# Patient Record
Sex: MALE | Race: WHITE | NOT HISPANIC OR LATINO | Employment: FULL TIME | URBAN - METROPOLITAN AREA
[De-identification: names, ages, dates, MRNs, and addresses within clinical notes are randomized per-mention and may not be internally consistent; named-entity substitution may affect disease eponyms.]

---

## 2017-02-22 PROBLEM — K76.0 FATTY LIVER: Status: ACTIVE | Noted: 2017-02-22

## 2017-02-22 PROBLEM — Z12.11 SCREENING FOR COLON CANCER: Status: ACTIVE | Noted: 2017-02-22

## 2017-07-25 PROBLEM — N17.9 AKI (ACUTE KIDNEY INJURY): Status: ACTIVE | Noted: 2017-07-25

## 2017-07-25 PROBLEM — E87.5 HYPERKALEMIA: Status: ACTIVE | Noted: 2017-07-25

## 2017-07-25 PROBLEM — R07.89 ATYPICAL CHEST PAIN: Status: ACTIVE | Noted: 2017-07-25

## 2017-07-25 PROBLEM — R07.9 CHEST PAIN IN ADULT: Status: ACTIVE | Noted: 2017-07-25

## 2017-11-08 ENCOUNTER — TELEPHONE (OUTPATIENT)
Dept: ADMINISTRATIVE | Facility: HOSPITAL | Age: 57
End: 2017-11-08

## 2021-05-06 ENCOUNTER — PATIENT MESSAGE (OUTPATIENT)
Dept: RESEARCH | Facility: HOSPITAL | Age: 61
End: 2021-05-06

## 2021-07-01 ENCOUNTER — PATIENT MESSAGE (OUTPATIENT)
Dept: ADMINISTRATIVE | Facility: OTHER | Age: 61
End: 2021-07-01

## 2023-06-30 ENCOUNTER — OFFICE VISIT (OUTPATIENT)
Dept: URGENT CARE | Facility: CLINIC | Age: 63
End: 2023-06-30
Payer: MEDICAID

## 2023-06-30 VITALS
WEIGHT: 218 LBS | HEIGHT: 67 IN | RESPIRATION RATE: 17 BRPM | SYSTOLIC BLOOD PRESSURE: 158 MMHG | OXYGEN SATURATION: 97 % | HEART RATE: 96 BPM | BODY MASS INDEX: 34.21 KG/M2 | DIASTOLIC BLOOD PRESSURE: 86 MMHG | TEMPERATURE: 98 F

## 2023-06-30 DIAGNOSIS — S61.012A LACERATION OF LEFT THUMB WITHOUT DAMAGE TO NAIL, FOREIGN BODY PRESENCE UNSPECIFIED, INITIAL ENCOUNTER: Primary | ICD-10-CM

## 2023-06-30 PROCEDURE — 90471 IMMUNIZATION ADMIN: CPT | Mod: S$GLB,,, | Performed by: NURSE PRACTITIONER

## 2023-06-30 PROCEDURE — 90715 TDAP VACCINE GREATER THAN OR EQUAL TO 7YO IM: ICD-10-PCS | Mod: S$GLB,,, | Performed by: NURSE PRACTITIONER

## 2023-06-30 PROCEDURE — 90715 TDAP VACCINE 7 YRS/> IM: CPT | Mod: S$GLB,,, | Performed by: NURSE PRACTITIONER

## 2023-06-30 PROCEDURE — 99204 PR OFFICE/OUTPT VISIT, NEW, LEVL IV, 45-59 MIN: ICD-10-PCS | Mod: 25,S$GLB,, | Performed by: NURSE PRACTITIONER

## 2023-06-30 PROCEDURE — 99204 OFFICE O/P NEW MOD 45 MIN: CPT | Mod: 25,S$GLB,, | Performed by: NURSE PRACTITIONER

## 2023-06-30 PROCEDURE — 90471 TDAP VACCINE GREATER THAN OR EQUAL TO 7YO IM: ICD-10-PCS | Mod: S$GLB,,, | Performed by: NURSE PRACTITIONER

## 2023-06-30 RX ORDER — MUPIROCIN 20 MG/G
OINTMENT TOPICAL 3 TIMES DAILY
Qty: 22 G | Refills: 0 | Status: SHIPPED | OUTPATIENT
Start: 2023-06-30 | End: 2023-07-03

## 2023-06-30 NOTE — PROGRESS NOTES
"Subjective:      Patient ID: Andrei Ortiz is a 62 y.o. male.    Vitals:  height is 5' 7" (1.702 m) and weight is 98.9 kg (218 lb). His oral temperature is 98 °F (36.7 °C). His blood pressure is 158/86 (abnormal) and his pulse is 96. His respiration is 17 and oxygen saturation is 97%.     Chief Complaint: Laceration    Patient presents with a left thumb laceration that happened 15-20 minutes ago.     Laceration   The incident occurred less than 1 hour ago. The laceration is located on the Left hand. The laceration is 4 cm in size. The laceration mechanism was a dirty knife. The pain is at a severity of 0/10. The patient is experiencing no pain. The pain has been Constant since onset. It is unknown if a foreign body is present. His tetanus status is unknown.     Constitution: Negative for activity change and appetite change.   Neck: Negative for neck pain and neck stiffness.   Cardiovascular: Negative.    Respiratory:  Negative for cough.    Skin:  Positive for laceration. Negative for color change and erythema.    Objective:     Physical Exam   Constitutional: He is oriented to person, place, and time. normal  HENT:   Head: Normocephalic.   Ears:   Right Ear: Tympanic membrane normal.   Left Ear: Tympanic membrane normal.   Nose: Nose normal.   Mouth/Throat: Mucous membranes are moist.   Eyes: Conjunctivae are normal. Pupils are equal, round, and reactive to light. Extraocular movement intact   Cardiovascular: Normal rate.   Pulmonary/Chest: Effort normal.   Abdominal: Normal appearance and bowel sounds are normal.   Musculoskeletal: Normal range of motion.         General: Signs of injury present. Normal range of motion.      Left hand: Left thumb: Exhibits bleeding. Injuries: laceration.   Neurological: no focal deficit. He is alert, oriented to person, place, and time and at baseline.   Skin: Skin is warm. Capillary refill takes less than 2 seconds. No erythema   Nursing note and vitals " reviewed.    Assessment:     1. Laceration of left thumb without damage to nail, foreign body presence unspecified, initial encounter        Plan:       Laceration of left thumb without damage to nail, foreign body presence unspecified, initial encounter    Other orders  -     (In Office Administered) Tdap Vaccine  -     mupirocin (BACTROBAN) 2 % ointment; Apply topically 3 (three) times daily. for 3 days  Dispense: 22 g; Refill: 0      Keep clean and dry with dial soap   Apply bactroban   Go to the emergency department for signs and symptoms of infection  Strict follow up with primary care in 2 days       Common Finger Injuries Discharge Instructions   About this topic   Fingers are made up of many small bones. Ligaments are strong bands of tissue that hold your bones together. There are also muscles and tendons in your fingers. These attach to the bones and help move the finger up, down, or sideways. Nerves and blood vessels also run through your finger. The nail protects the end of your finger. Damage or injury to any of these structures can lead to finger pain and problems.  Common finger injuries include:  Broken or dislocated bone  Problems with tendons and ligaments  Cuts, bites, or infection  Problems with the fingernail  Amputation       What care is needed at home?   Ask your doctor what you need to do when you go home. Make sure you ask questions if you do not understand what the doctor says. This way you will know what you need to do.  Rest and avoid activities that make your problem worse.  Place an ice pack or a bag of frozen peas wrapped in a towel over your finger. Never put ice directly on the skin. Do not leave the ice on more than 10 to 15 minutes at a time.  Prop your hand up on pillows when possible to lessen swelling.  Wear a splint to keep the finger from moving or for support if your doctor suggests you do so.  Tape fingers together to limit movement if your doctor suggests you to do  so.  Lightly wrap the finger in gauze or a finger bandage to help lessen swelling.  Change wound dressings if you have an open area. Your doctor will tell you how to do this.  Take drugs to lessen pain and swelling or to prevent or fight infections if you doctor orders them.  Do exercise that your doctor or therapist suggests.  What follow-up care is needed?   Your doctors may ask you to make visits to the office to check on your progress. Be sure to keep these visits.  Your doctor may send you to physical therapy (PT) or occupational therapy (OT) for treatments and exercises to help you heal faster.  Your doctor may also send you to a hand specialist or surgeon.  What drugs may be needed?   The doctor may order drugs to:  Help with pain and swelling, like ibuprofen (Advil, Motrin). These are nonsteroidal anti-inflammatory drugs (NSAIDs).  Help with pain, such as acetaminophen (Tylenol)  Prevent or fight an infection  The doctor may give you a shot of an anti-inflammatory drug called a corticosteroid. This will help with swelling. Talk with your doctor about the risks of this shot.  Will physical activity be limited?   You may need to rest your finger for a while. You should not do physical activity that makes your health problem worse. If you work out or play sports, you may not be able to do those things until your health problem gets better. Based on the problem, you may have to go to physical therapy (PT) or occupational therapy (OT) for a few weeks or months. You may have to wear a splint or brace for a few weeks. If you have surgery, recovery may take about 3 to 6 months before you can go back to normal activities.  What problems could happen?   Loss of finger movement or strength  Ongoing pain or stiffness  Long-term disability  Injury to nerves, blood vessels, or other tissues  What can be done to prevent this health problem?   Wear protective equipment when playing sports.  Take rests often when doing  something with repeat hand motions  Follow all safety precautions when running machinery.  Do not wear rings when working with machinery.  Do not run equipment or machines when tired.  Use caution when cutting with knives. Make sure the blades stay sharp. Dull blades can slip and cause injuries.  Do not approach fighting dogs or animals. Be careful when getting near an animal that you do not know.  When do I need to call the doctor?   Signs of infection. These include a fever of 100.4°F (38°C) or higher, chills, wound that will not heal.  Signs of wound infection. These include swelling, redness, warmth around the wound; too much pain when touched; yellowish, greenish, or bloody discharge; foul smell coming from the cut site; cut site opens up.  Numbness and tingling get worse  Hand or fingers feel cold and pale  Teach Back: Helping You Understand   The Teach Back Method helps you understand the information we are giving you. After you talk with the staff, tell them in your own words what you learned. This helps to make sure the staff has described each thing clearly. It also helps to explain things that may have been confusing. Before going home, make sure you can do these:  I can tell you about my condition.  I can tell you how to care for my injured area.  I can tell you what may help ease my pain.  I can tell you what I will do if I have more pain, numbness, tingling, or swelling.  Last Reviewed Date   2020-10-12  Consumer Information Use and Disclaimer   This information is not specific medical advice and does not replace information you receive from your health care provider. This is only a brief summary of general information. It does NOT include all information about conditions, illnesses, injuries, tests, procedures, treatments, therapies, discharge instructions or life-style choices that may apply to you. You must talk with your health care provider for complete information about your health and treatment  options. This information should not be used to decide whether or not to accept your health care providers advice, instructions or recommendations. Only your health care provider has the knowledge and training to provide advice that is right for you.  Copyright   Copyright © 2021 [employer] and its affiliates and/or licensors. All rights reserved.

## 2023-06-30 NOTE — PATIENT INSTRUCTIONS
Keep clean and dry with dial soap   Bulky dressing   Apply bactroban   Go to the emergency department for signs and symptoms of infection  Strict follow up with primary care in 2 days